# Patient Record
Sex: MALE | Race: WHITE | NOT HISPANIC OR LATINO
[De-identification: names, ages, dates, MRNs, and addresses within clinical notes are randomized per-mention and may not be internally consistent; named-entity substitution may affect disease eponyms.]

---

## 2017-01-30 ENCOUNTER — MEDICATION RENEWAL (OUTPATIENT)
Age: 37
End: 2017-01-30

## 2017-04-25 ENCOUNTER — RX RENEWAL (OUTPATIENT)
Age: 37
End: 2017-04-25

## 2017-04-27 ENCOUNTER — APPOINTMENT (OUTPATIENT)
Dept: CARDIOLOGY | Facility: CLINIC | Age: 37
End: 2017-04-27

## 2017-07-24 ENCOUNTER — APPOINTMENT (OUTPATIENT)
Dept: RADIOLOGY | Facility: HOSPITAL | Age: 37
End: 2017-07-24

## 2017-07-24 ENCOUNTER — OUTPATIENT (OUTPATIENT)
Dept: OUTPATIENT SERVICES | Facility: HOSPITAL | Age: 37
LOS: 1 days | End: 2017-07-24
Payer: COMMERCIAL

## 2017-07-24 PROCEDURE — 74018 RADEX ABDOMEN 1 VIEW: CPT

## 2017-08-02 ENCOUNTER — RX RENEWAL (OUTPATIENT)
Age: 37
End: 2017-08-02

## 2017-10-26 ENCOUNTER — APPOINTMENT (OUTPATIENT)
Dept: CARDIOLOGY | Facility: CLINIC | Age: 37
End: 2017-10-26
Payer: COMMERCIAL

## 2017-10-26 ENCOUNTER — NON-APPOINTMENT (OUTPATIENT)
Age: 37
End: 2017-10-26

## 2017-10-26 VITALS
OXYGEN SATURATION: 97 % | HEIGHT: 72 IN | DIASTOLIC BLOOD PRESSURE: 82 MMHG | RESPIRATION RATE: 15 BRPM | HEART RATE: 61 BPM | SYSTOLIC BLOOD PRESSURE: 125 MMHG | WEIGHT: 214 LBS | BODY MASS INDEX: 28.99 KG/M2

## 2017-10-26 PROCEDURE — 99214 OFFICE O/P EST MOD 30 MIN: CPT

## 2017-10-26 PROCEDURE — 93000 ELECTROCARDIOGRAM COMPLETE: CPT

## 2017-10-26 RX ORDER — UBIDECARENONE/VIT E ACET 100MG-5
50 MCG CAPSULE ORAL
Refills: 0 | Status: ACTIVE | COMMUNITY

## 2017-11-06 ENCOUNTER — RX RENEWAL (OUTPATIENT)
Age: 37
End: 2017-11-06

## 2018-04-19 ENCOUNTER — APPOINTMENT (OUTPATIENT)
Dept: CARDIOLOGY | Facility: CLINIC | Age: 38
End: 2018-04-19

## 2018-07-25 ENCOUNTER — OUTPATIENT (OUTPATIENT)
Dept: OUTPATIENT SERVICES | Facility: HOSPITAL | Age: 38
LOS: 1 days | End: 2018-07-25
Payer: COMMERCIAL

## 2018-07-25 ENCOUNTER — APPOINTMENT (OUTPATIENT)
Dept: RADIOLOGY | Facility: HOSPITAL | Age: 38
End: 2018-07-25

## 2018-07-25 DIAGNOSIS — Z00.8 ENCOUNTER FOR OTHER GENERAL EXAMINATION: ICD-10-CM

## 2018-07-25 PROCEDURE — 73030 X-RAY EXAM OF SHOULDER: CPT | Mod: 26,RT

## 2018-07-25 PROCEDURE — 73030 X-RAY EXAM OF SHOULDER: CPT

## 2018-09-06 ENCOUNTER — NON-APPOINTMENT (OUTPATIENT)
Age: 38
End: 2018-09-06

## 2018-09-06 ENCOUNTER — APPOINTMENT (OUTPATIENT)
Dept: CARDIOLOGY | Facility: CLINIC | Age: 38
End: 2018-09-06
Payer: COMMERCIAL

## 2018-09-06 VITALS
SYSTOLIC BLOOD PRESSURE: 137 MMHG | RESPIRATION RATE: 15 BRPM | DIASTOLIC BLOOD PRESSURE: 86 MMHG | WEIGHT: 226 LBS | OXYGEN SATURATION: 98 % | BODY MASS INDEX: 30.61 KG/M2 | HEART RATE: 73 BPM | HEIGHT: 72 IN

## 2018-09-06 DIAGNOSIS — M25.473 EFFUSION, UNSPECIFIED ANKLE: ICD-10-CM

## 2018-09-06 PROCEDURE — 99214 OFFICE O/P EST MOD 30 MIN: CPT

## 2018-09-06 PROCEDURE — 93000 ELECTROCARDIOGRAM COMPLETE: CPT

## 2018-10-02 RX ORDER — CHLORTHALIDONE 25 MG/1
25 TABLET ORAL DAILY
Qty: 15 | Refills: 4 | Status: DISCONTINUED | COMMUNITY
Start: 2018-09-06 | End: 2018-10-02

## 2018-11-05 ENCOUNTER — RX RENEWAL (OUTPATIENT)
Age: 38
End: 2018-11-05

## 2018-12-04 ENCOUNTER — RX RENEWAL (OUTPATIENT)
Age: 38
End: 2018-12-04

## 2018-12-06 ENCOUNTER — APPOINTMENT (OUTPATIENT)
Dept: CARDIOLOGY | Facility: CLINIC | Age: 38
End: 2018-12-06
Payer: COMMERCIAL

## 2018-12-06 VITALS
OXYGEN SATURATION: 99 % | SYSTOLIC BLOOD PRESSURE: 146 MMHG | BODY MASS INDEX: 30.61 KG/M2 | HEIGHT: 72 IN | DIASTOLIC BLOOD PRESSURE: 97 MMHG | HEART RATE: 80 BPM | WEIGHT: 226 LBS | RESPIRATION RATE: 15 BRPM

## 2018-12-06 VITALS — SYSTOLIC BLOOD PRESSURE: 142 MMHG | DIASTOLIC BLOOD PRESSURE: 101 MMHG

## 2018-12-06 PROCEDURE — 99213 OFFICE O/P EST LOW 20 MIN: CPT

## 2018-12-06 NOTE — PHYSICAL EXAM
[General Appearance - Well Developed] : well developed [Normal Appearance] : normal appearance [Well Groomed] : well groomed [General Appearance - Well Nourished] : well nourished [No Deformities] : no deformities [General Appearance - In No Acute Distress] : no acute distress [Normal Conjunctiva] : the conjunctiva exhibited no abnormalities [Eyelids - No Xanthelasma] : the eyelids demonstrated no xanthelasmas [Normal Oral Mucosa] : normal oral mucosa [No Oral Pallor] : no oral pallor [No Oral Cyanosis] : no oral cyanosis [FreeTextEntry1] : JVP normal. No bruits. [Respiration, Rhythm And Depth] : normal respiratory rhythm and effort [Exaggerated Use Of Accessory Muscles For Inspiration] : no accessory muscle use [Auscultation Breath Sounds / Voice Sounds] : lungs were clear to auscultation bilaterally [Heart Rate And Rhythm] : heart rate and rhythm were normal [Heart Sounds] : normal S1 and S2 [Murmurs] : no murmurs present [Abdomen Soft] : soft [Abdomen Tenderness] : non-tender [Abdomen Mass (___ Cm)] : no abdominal mass palpated [Abnormal Walk] : normal gait [Gait - Sufficient For Exercise Testing] : the gait was sufficient for exercise testing [Nail Clubbing] : no clubbing of the fingernails [Cyanosis, Localized] : no localized cyanosis [] : no ischemic changes [Skin Color & Pigmentation] : normal skin color and pigmentation [Skin Turgor] : normal skin turgor [Affect] : the affect was normal [Mood] : the mood was normal

## 2018-12-06 NOTE — DISCUSSION/SUMMARY
[Patient] : the patient [Risks] : risks [Benefits] : benefits [Alternatives] : alternatives [___ Month(s)] : [unfilled] month(s) [FreeTextEntry1] : Discussed with him regarding better compliance exercise diet weight loss low-sodium diet increase of medication followup in several months repeat labs later next year

## 2018-12-06 NOTE — REASON FOR VISIT
[Hypertension] : hypertension [Medication Management] : Medication management [FreeTextEntry1] : \par He feels well. Has stress related to household situation.\par \par He denied any chest pain dyspnea or palpitations.

## 2018-12-06 NOTE — REVIEW OF SYSTEMS
[Recent Weight Gain (___ Lbs)] : recent [unfilled] ~Ulb weight gain [see HPI] : see HPI [Dizziness] : no dizziness [Negative] : Heme/Lymph

## 2018-12-26 ENCOUNTER — RX RENEWAL (OUTPATIENT)
Age: 38
End: 2018-12-26

## 2019-01-31 ENCOUNTER — RX RENEWAL (OUTPATIENT)
Age: 39
End: 2019-01-31

## 2019-02-26 ENCOUNTER — RX RENEWAL (OUTPATIENT)
Age: 39
End: 2019-02-26

## 2019-04-12 ENCOUNTER — RX RENEWAL (OUTPATIENT)
Age: 39
End: 2019-04-12

## 2019-04-21 ENCOUNTER — RX RENEWAL (OUTPATIENT)
Age: 39
End: 2019-04-21

## 2019-05-16 ENCOUNTER — RX RENEWAL (OUTPATIENT)
Age: 39
End: 2019-05-16

## 2019-05-20 ENCOUNTER — RX RENEWAL (OUTPATIENT)
Age: 39
End: 2019-05-20

## 2019-06-11 ENCOUNTER — RX RENEWAL (OUTPATIENT)
Age: 39
End: 2019-06-11

## 2019-06-19 ENCOUNTER — RX RENEWAL (OUTPATIENT)
Age: 39
End: 2019-06-19

## 2019-07-21 ENCOUNTER — RX RENEWAL (OUTPATIENT)
Age: 39
End: 2019-07-21

## 2019-08-01 ENCOUNTER — RX RENEWAL (OUTPATIENT)
Age: 39
End: 2019-08-01

## 2019-08-17 ENCOUNTER — RX RENEWAL (OUTPATIENT)
Age: 39
End: 2019-08-17

## 2019-08-29 ENCOUNTER — RX RENEWAL (OUTPATIENT)
Age: 39
End: 2019-08-29

## 2019-09-16 ENCOUNTER — RX RENEWAL (OUTPATIENT)
Age: 39
End: 2019-09-16

## 2019-10-28 ENCOUNTER — RX RENEWAL (OUTPATIENT)
Age: 39
End: 2019-10-28

## 2019-11-24 ENCOUNTER — RX RENEWAL (OUTPATIENT)
Age: 39
End: 2019-11-24

## 2019-12-23 ENCOUNTER — RX RENEWAL (OUTPATIENT)
Age: 39
End: 2019-12-23

## 2020-01-21 ENCOUNTER — RX RENEWAL (OUTPATIENT)
Age: 40
End: 2020-01-21

## 2020-02-06 ENCOUNTER — RX RENEWAL (OUTPATIENT)
Age: 40
End: 2020-02-06

## 2020-02-24 ENCOUNTER — RX RENEWAL (OUTPATIENT)
Age: 40
End: 2020-02-24

## 2020-03-06 ENCOUNTER — RX RENEWAL (OUTPATIENT)
Age: 40
End: 2020-03-06

## 2020-03-09 ENCOUNTER — APPOINTMENT (OUTPATIENT)
Dept: CARDIOLOGY | Facility: CLINIC | Age: 40
End: 2020-03-09
Payer: COMMERCIAL

## 2020-03-09 VITALS — SYSTOLIC BLOOD PRESSURE: 162 MMHG | DIASTOLIC BLOOD PRESSURE: 110 MMHG

## 2020-03-09 VITALS
DIASTOLIC BLOOD PRESSURE: 111 MMHG | RESPIRATION RATE: 16 BRPM | WEIGHT: 242 LBS | OXYGEN SATURATION: 98 % | HEART RATE: 89 BPM | SYSTOLIC BLOOD PRESSURE: 170 MMHG | HEIGHT: 72 IN | BODY MASS INDEX: 32.78 KG/M2

## 2020-03-09 PROCEDURE — 99214 OFFICE O/P EST MOD 30 MIN: CPT

## 2020-03-09 NOTE — ASSESSMENT
[FreeTextEntry1] : Uncontrolled hypertension. Headache which is probably unrelated. Previous GI upset with thiazide

## 2020-03-09 NOTE — REVIEW OF SYSTEMS
[see HPI] : see HPI [Headache] : headache [Recent Weight Gain (___ Lbs)] : no recent weight gain [Dizziness] : no dizziness [Negative] : Heme/Lymph

## 2020-03-09 NOTE — DISCUSSION/SUMMARY
[Patient] : the patient [Risks] : risks [Benefits] : benefits [Alternatives] : alternatives [___ Day(s)] : [unfilled] day(s) [FreeTextEntry1] : Discussed with patient initiated labetalol in addition the current medicine followup in 3 days labwork and renal duplex to be obtained regarding elevated blood pressure

## 2020-03-09 NOTE — PHYSICAL EXAM
[General Appearance - Well Developed] : well developed [Normal Appearance] : normal appearance [Well Groomed] : well groomed [General Appearance - Well Nourished] : well nourished [No Deformities] : no deformities [General Appearance - In No Acute Distress] : no acute distress [Normal Conjunctiva] : the conjunctiva exhibited no abnormalities [Eyelids - No Xanthelasma] : the eyelids demonstrated no xanthelasmas [Normal Oral Mucosa] : normal oral mucosa [No Oral Pallor] : no oral pallor [No Oral Cyanosis] : no oral cyanosis [Respiration, Rhythm And Depth] : normal respiratory rhythm and effort [Exaggerated Use Of Accessory Muscles For Inspiration] : no accessory muscle use [Auscultation Breath Sounds / Voice Sounds] : lungs were clear to auscultation bilaterally [Heart Rate And Rhythm] : heart rate and rhythm were normal [Heart Sounds] : normal S1 and S2 [Murmurs] : no murmurs present [Abdomen Soft] : soft [Abdomen Tenderness] : non-tender [Abdomen Mass (___ Cm)] : no abdominal mass palpated [FreeTextEntry1] : no bruits [Abnormal Walk] : normal gait [Gait - Sufficient For Exercise Testing] : the gait was sufficient for exercise testing [Nail Clubbing] : no clubbing of the fingernails [Cyanosis, Localized] : no localized cyanosis [] : no ischemic changes [Skin Color & Pigmentation] : normal skin color and pigmentation [Skin Turgor] : normal skin turgor [Affect] : the affect was normal [Mood] : the mood was normal

## 2020-03-09 NOTE — HISTORY OF PRESENT ILLNESS
[FreeTextEntry1] : Presents urgently with elevated blood pressure last night diastolics over 110 and elevated systolics as well as her chronic headache which is not unusual takes Advil about twice a week is not meticulous about sodium in diet but is compliant with medication. No chest pain shortness of breath.

## 2020-03-12 ENCOUNTER — APPOINTMENT (OUTPATIENT)
Dept: CARDIOLOGY | Facility: CLINIC | Age: 40
End: 2020-03-12
Payer: COMMERCIAL

## 2020-03-12 VITALS
RESPIRATION RATE: 16 BRPM | OXYGEN SATURATION: 97 % | SYSTOLIC BLOOD PRESSURE: 160 MMHG | DIASTOLIC BLOOD PRESSURE: 92 MMHG | HEART RATE: 79 BPM | HEIGHT: 72 IN

## 2020-03-12 VITALS — SYSTOLIC BLOOD PRESSURE: 142 MMHG | DIASTOLIC BLOOD PRESSURE: 90 MMHG

## 2020-03-12 PROCEDURE — 99213 OFFICE O/P EST LOW 20 MIN: CPT

## 2020-03-12 RX ORDER — LABETALOL HYDROCHLORIDE 100 MG/1
100 TABLET, FILM COATED ORAL
Qty: 60 | Refills: 2 | Status: DISCONTINUED | COMMUNITY
Start: 2020-03-09 | End: 2020-03-12

## 2020-03-12 NOTE — DISCUSSION/SUMMARY
[Patient] : the patient [Risks] : risks [Benefits] : benefits [Alternatives] : alternatives [___ Week(s)] : [unfilled] week(s) [FreeTextEntry1] : Will increase labetalol will be 3 times a day. Questions addressed dietary counseling performed monitor BP at home once or twice a day followup in 3-4 weeks. Requesting labs from PMD Dr. Gorman

## 2020-03-12 NOTE — HISTORY OF PRESENT ILLNESS
[FreeTextEntry1] : Tolerated medications without problem BPs range from 120 systolic to about 170 sometimes in the afternoon is taking labetalol twice daily no chest pain flushing palpitations leg edema. Has cut down on sodium and carbs. Feels okay.

## 2020-03-12 NOTE — ASSESSMENT
[FreeTextEntry1] : Improving hypertension with lability still present long-standing history of hypertension. Overweight.

## 2020-03-12 NOTE — REVIEW OF SYSTEMS
[Headache] : no headache [Recent Weight Gain (___ Lbs)] : no recent weight gain [Dizziness] : no dizziness [Under Stress] : under stress [Negative] : Heme/Lymph

## 2020-03-12 NOTE — PHYSICAL EXAM
[General Appearance - Well Developed] : well developed [Normal Appearance] : normal appearance [Well Groomed] : well groomed [General Appearance - Well Nourished] : well nourished [No Deformities] : no deformities [General Appearance - In No Acute Distress] : no acute distress [Normal Conjunctiva] : the conjunctiva exhibited no abnormalities [Eyelids - No Xanthelasma] : the eyelids demonstrated no xanthelasmas [Affect] : the affect was normal [Mood] : the mood was normal

## 2020-03-22 ENCOUNTER — RX RENEWAL (OUTPATIENT)
Age: 40
End: 2020-03-22

## 2020-04-06 ENCOUNTER — APPOINTMENT (OUTPATIENT)
Dept: CARDIOLOGY | Facility: CLINIC | Age: 40
End: 2020-04-06

## 2020-06-05 ENCOUNTER — RX RENEWAL (OUTPATIENT)
Age: 40
End: 2020-06-05

## 2020-07-10 ENCOUNTER — RX RENEWAL (OUTPATIENT)
Age: 40
End: 2020-07-10

## 2020-07-12 ENCOUNTER — RX RENEWAL (OUTPATIENT)
Age: 40
End: 2020-07-12

## 2020-09-17 ENCOUNTER — APPOINTMENT (OUTPATIENT)
Dept: CARDIOLOGY | Facility: CLINIC | Age: 40
End: 2020-09-17
Payer: COMMERCIAL

## 2020-09-17 ENCOUNTER — TRANSCRIPTION ENCOUNTER (OUTPATIENT)
Age: 40
End: 2020-09-17

## 2020-09-17 ENCOUNTER — NON-APPOINTMENT (OUTPATIENT)
Age: 40
End: 2020-09-17

## 2020-09-17 VITALS
RESPIRATION RATE: 16 BRPM | OXYGEN SATURATION: 99 % | WEIGHT: 222 LBS | DIASTOLIC BLOOD PRESSURE: 84 MMHG | SYSTOLIC BLOOD PRESSURE: 127 MMHG | TEMPERATURE: 98.7 F | HEIGHT: 72 IN | HEART RATE: 82 BPM | BODY MASS INDEX: 30.07 KG/M2

## 2020-09-17 PROCEDURE — 93000 ELECTROCARDIOGRAM COMPLETE: CPT

## 2020-09-17 PROCEDURE — 99213 OFFICE O/P EST LOW 20 MIN: CPT

## 2020-09-17 NOTE — PHYSICAL EXAM
[General Appearance - Well Developed] : well developed [Normal Appearance] : normal appearance [Well Groomed] : well groomed [General Appearance - Well Nourished] : well nourished [No Deformities] : no deformities [General Appearance - In No Acute Distress] : no acute distress [Normal Conjunctiva] : the conjunctiva exhibited no abnormalities [Eyelids - No Xanthelasma] : the eyelids demonstrated no xanthelasmas [FreeTextEntry1] : JVP normal. No bruits. [Respiration, Rhythm And Depth] : normal respiratory rhythm and effort [Exaggerated Use Of Accessory Muscles For Inspiration] : no accessory muscle use [Auscultation Breath Sounds / Voice Sounds] : lungs were clear to auscultation bilaterally [Heart Rate And Rhythm] : heart rate and rhythm were normal [Heart Sounds] : normal S1 and S2 [Murmurs] : no murmurs present [Abdomen Soft] : soft [Abdomen Tenderness] : non-tender [] : no hepato-splenomegaly [Abdomen Mass (___ Cm)] : no abdominal mass palpated [Abnormal Walk] : normal gait [Gait - Sufficient For Exercise Testing] : the gait was sufficient for exercise testing [Nail Clubbing] : no clubbing of the fingernails [Cyanosis, Localized] : no localized cyanosis [Skin Color & Pigmentation] : normal skin color and pigmentation [Skin Turgor] : normal skin turgor

## 2020-09-17 NOTE — DISCUSSION/SUMMARY
[Patient] : the patient [Risks] : risks [Benefits] : benefits [Alternatives] : alternatives [___ Month(s)] : [unfilled] month(s) [FreeTextEntry1] : Requesting labs from PMD Dr. Gorman\par reports influenza vaccine today

## 2020-09-17 NOTE — HISTORY OF PRESENT ILLNESS
[FreeTextEntry1] : blood pressure seems to have improved takes labetalol 3 times a day other meds as previously noted has been at home doing some exercise and losing weight voluntarily no chest pain dyspnea or palpitations

## 2020-09-17 NOTE — ASSESSMENT
[FreeTextEntry1] : Improving hypertension with lability still present long-standing history of hypertension. Overweight, improved

## 2020-09-21 ENCOUNTER — RX RENEWAL (OUTPATIENT)
Age: 40
End: 2020-09-21

## 2021-01-16 ENCOUNTER — RX RENEWAL (OUTPATIENT)
Age: 41
End: 2021-01-16

## 2021-02-14 ENCOUNTER — RX RENEWAL (OUTPATIENT)
Age: 41
End: 2021-02-14

## 2021-02-22 ENCOUNTER — APPOINTMENT (OUTPATIENT)
Dept: CARDIOLOGY | Facility: CLINIC | Age: 41
End: 2021-02-22
Payer: COMMERCIAL

## 2021-02-22 ENCOUNTER — NON-APPOINTMENT (OUTPATIENT)
Age: 41
End: 2021-02-22

## 2021-02-22 VITALS
TEMPERATURE: 97.8 F | SYSTOLIC BLOOD PRESSURE: 137 MMHG | WEIGHT: 232 LBS | OXYGEN SATURATION: 98 % | BODY MASS INDEX: 31.42 KG/M2 | RESPIRATION RATE: 16 BRPM | HEART RATE: 79 BPM | HEIGHT: 72 IN | DIASTOLIC BLOOD PRESSURE: 88 MMHG

## 2021-02-22 VITALS — DIASTOLIC BLOOD PRESSURE: 82 MMHG | SYSTOLIC BLOOD PRESSURE: 134 MMHG

## 2021-02-22 PROCEDURE — 99072 ADDL SUPL MATRL&STAF TM PHE: CPT

## 2021-02-22 PROCEDURE — 99213 OFFICE O/P EST LOW 20 MIN: CPT

## 2021-02-22 PROCEDURE — 93000 ELECTROCARDIOGRAM COMPLETE: CPT

## 2021-02-22 NOTE — HISTORY OF PRESENT ILLNESS
[FreeTextEntry1] : Overall feeling well.  Active physically.  No change in medications.  No chest pain dyspnea or palpitations, although sometimes he feels like something drops for an instant in his chest happens rarely less than once a month.

## 2021-02-22 NOTE — DISCUSSION/SUMMARY
[Patient] : the patient [Risks] : risks [Benefits] : benefits [Alternatives] : alternatives [___ Month(s)] : [unfilled] month(s) [FreeTextEntry1] : \par Patient advised to continue current cardiac medication at this time.\par Recommended diet and weight loss, regular exericse.\par exercise program recommended and discussed\par

## 2021-02-22 NOTE — ASSESSMENT
[FreeTextEntry1] : Improving hypertension with lability still present long-standing history of hypertension.

## 2021-02-22 NOTE — REVIEW OF SYSTEMS
[Headache] : no headache [Recent Weight Gain (___ Lbs)] : recent [unfilled] ~Ulb weight gain [Dizziness] : no dizziness [Under Stress] : under stress [Negative] : Heme/Lymph

## 2021-03-13 ENCOUNTER — RX RENEWAL (OUTPATIENT)
Age: 41
End: 2021-03-13

## 2021-03-25 ENCOUNTER — RX RENEWAL (OUTPATIENT)
Age: 41
End: 2021-03-25

## 2021-05-04 ENCOUNTER — RESULT REVIEW (OUTPATIENT)
Age: 41
End: 2021-05-04

## 2021-05-04 ENCOUNTER — APPOINTMENT (OUTPATIENT)
Dept: ULTRASOUND IMAGING | Facility: CLINIC | Age: 41
End: 2021-05-04
Payer: COMMERCIAL

## 2021-05-04 ENCOUNTER — OUTPATIENT (OUTPATIENT)
Dept: OUTPATIENT SERVICES | Facility: HOSPITAL | Age: 41
LOS: 1 days | End: 2021-05-04
Payer: COMMERCIAL

## 2021-05-04 DIAGNOSIS — I10 ESSENTIAL (PRIMARY) HYPERTENSION: ICD-10-CM

## 2021-05-04 PROCEDURE — 93975 VASCULAR STUDY: CPT | Mod: 26

## 2021-05-04 PROCEDURE — 93975 VASCULAR STUDY: CPT

## 2021-05-05 ENCOUNTER — NON-APPOINTMENT (OUTPATIENT)
Age: 41
End: 2021-05-05

## 2021-06-03 DIAGNOSIS — R00.2 PALPITATIONS: ICD-10-CM

## 2021-06-04 ENCOUNTER — APPOINTMENT (OUTPATIENT)
Dept: CARDIOLOGY | Facility: CLINIC | Age: 41
End: 2021-06-04
Payer: COMMERCIAL

## 2021-06-04 PROCEDURE — 99072 ADDL SUPL MATRL&STAF TM PHE: CPT

## 2021-06-10 ENCOUNTER — NON-APPOINTMENT (OUTPATIENT)
Age: 41
End: 2021-06-10

## 2021-06-10 PROCEDURE — 93224 XTRNL ECG REC UP TO 48 HRS: CPT

## 2021-06-14 ENCOUNTER — APPOINTMENT (OUTPATIENT)
Dept: CARDIOLOGY | Facility: CLINIC | Age: 41
End: 2021-06-14
Payer: COMMERCIAL

## 2021-06-14 VITALS
SYSTOLIC BLOOD PRESSURE: 129 MMHG | HEIGHT: 72 IN | HEART RATE: 65 BPM | BODY MASS INDEX: 30.88 KG/M2 | WEIGHT: 228 LBS | DIASTOLIC BLOOD PRESSURE: 85 MMHG | TEMPERATURE: 97 F | RESPIRATION RATE: 16 BRPM | OXYGEN SATURATION: 97 %

## 2021-06-14 DIAGNOSIS — I49.3 VENTRICULAR PREMATURE DEPOLARIZATION: ICD-10-CM

## 2021-06-14 DIAGNOSIS — R73.01 IMPAIRED FASTING GLUCOSE: ICD-10-CM

## 2021-06-14 DIAGNOSIS — I34.1 NONRHEUMATIC MITRAL (VALVE) PROLAPSE: ICD-10-CM

## 2021-06-14 PROCEDURE — 99072 ADDL SUPL MATRL&STAF TM PHE: CPT

## 2021-06-14 PROCEDURE — 99214 OFFICE O/P EST MOD 30 MIN: CPT

## 2021-06-14 NOTE — ASSESSMENT
[FreeTextEntry1] : Hypertension on therapy.  Palpitations improving probably related to PVCs demonstrated on Holter

## 2021-06-14 NOTE — DISCUSSION/SUMMARY
[Patient] : the patient [Risks] : risks [Benefits] : benefits [Alternatives] : alternatives [FreeTextEntry1] : Discussed with patient.  Lab work ordered.  Caffeine seems to not influence his symptoms.  ENT follow-up.  Continue current medication including beta-blocker.  Questions addressed with patient.  Return for echo regarding palpitations PVCs.  Call me for blood work results.

## 2021-06-21 ENCOUNTER — RX RENEWAL (OUTPATIENT)
Age: 41
End: 2021-06-21

## 2021-06-21 ENCOUNTER — APPOINTMENT (OUTPATIENT)
Dept: CARDIOLOGY | Facility: CLINIC | Age: 41
End: 2021-06-21
Payer: COMMERCIAL

## 2021-06-21 PROCEDURE — 99072 ADDL SUPL MATRL&STAF TM PHE: CPT

## 2021-06-21 PROCEDURE — 93306 TTE W/DOPPLER COMPLETE: CPT

## 2021-06-22 LAB
ALBUMIN SERPL ELPH-MCNC: 4.2 G/DL
ALP BLD-CCNC: 61 U/L
ALT SERPL-CCNC: 17 U/L
ANION GAP SERPL CALC-SCNC: 9 MMOL/L
AST SERPL-CCNC: 14 U/L
BASOPHILS # BLD AUTO: 0.05 K/UL
BASOPHILS NFR BLD AUTO: 1.1 %
BILIRUB SERPL-MCNC: 0.2 MG/DL
BUN SERPL-MCNC: 21 MG/DL
CALCIUM SERPL-MCNC: 9.6 MG/DL
CHLORIDE SERPL-SCNC: 106 MMOL/L
CHOLEST SERPL-MCNC: 204 MG/DL
CO2 SERPL-SCNC: 25 MMOL/L
CREAT SERPL-MCNC: 1.15 MG/DL
EOSINOPHIL # BLD AUTO: 0.21 K/UL
EOSINOPHIL NFR BLD AUTO: 4.5 %
ESTIMATED AVERAGE GLUCOSE: 111 MG/DL
GLUCOSE BS SERPL-MCNC: 93 MG/DL
GLUCOSE SERPL-MCNC: 97 MG/DL
HBA1C MFR BLD HPLC: 5.5 %
HCT VFR BLD CALC: 40.5 %
HDLC SERPL-MCNC: 46 MG/DL
HGB BLD-MCNC: 13 G/DL
IMM GRANULOCYTES NFR BLD AUTO: 0.2 %
LDLC SERPL CALC-MCNC: 99 MG/DL
LYMPHOCYTES # BLD AUTO: 1.84 K/UL
LYMPHOCYTES NFR BLD AUTO: 39.7 %
MAGNESIUM SERPL-MCNC: 2 MG/DL
MAN DIFF?: NORMAL
MCHC RBC-ENTMCNC: 30.2 PG
MCHC RBC-ENTMCNC: 32.1 GM/DL
MCV RBC AUTO: 94 FL
MONOCYTES # BLD AUTO: 0.61 K/UL
MONOCYTES NFR BLD AUTO: 13.2 %
NEUTROPHILS # BLD AUTO: 1.91 K/UL
NEUTROPHILS NFR BLD AUTO: 41.3 %
NONHDLC SERPL-MCNC: 157 MG/DL
PLATELET # BLD AUTO: 337 K/UL
POTASSIUM SERPL-SCNC: 4.5 MMOL/L
PROT SERPL-MCNC: 6.7 G/DL
RBC # BLD: 4.31 M/UL
RBC # FLD: 13.1 %
SODIUM SERPL-SCNC: 140 MMOL/L
T3FREE SERPL-MCNC: 2.86 PG/ML
T4 FREE SERPL-MCNC: 1 NG/DL
TRIGL SERPL-MCNC: 292 MG/DL
TSH SERPL-ACNC: 1.46 UIU/ML
WBC # FLD AUTO: 4.63 K/UL

## 2021-08-02 ENCOUNTER — RX RENEWAL (OUTPATIENT)
Age: 41
End: 2021-08-02

## 2021-10-18 ENCOUNTER — RX RENEWAL (OUTPATIENT)
Age: 41
End: 2021-10-18

## 2021-10-18 ENCOUNTER — APPOINTMENT (OUTPATIENT)
Dept: CARDIOLOGY | Facility: CLINIC | Age: 41
End: 2021-10-18

## 2022-01-16 ENCOUNTER — RX RENEWAL (OUTPATIENT)
Age: 42
End: 2022-01-16

## 2022-02-11 ENCOUNTER — RX RENEWAL (OUTPATIENT)
Age: 42
End: 2022-02-11

## 2022-02-17 ENCOUNTER — NON-APPOINTMENT (OUTPATIENT)
Age: 42
End: 2022-02-17

## 2022-02-17 ENCOUNTER — APPOINTMENT (OUTPATIENT)
Dept: CARDIOLOGY | Facility: CLINIC | Age: 42
End: 2022-02-17
Payer: COMMERCIAL

## 2022-02-17 VITALS
DIASTOLIC BLOOD PRESSURE: 91 MMHG | RESPIRATION RATE: 16 BRPM | HEART RATE: 86 BPM | HEIGHT: 72 IN | TEMPERATURE: 97.5 F | WEIGHT: 225 LBS | BODY MASS INDEX: 30.48 KG/M2 | OXYGEN SATURATION: 96 % | SYSTOLIC BLOOD PRESSURE: 159 MMHG

## 2022-02-17 VITALS — DIASTOLIC BLOOD PRESSURE: 94 MMHG | SYSTOLIC BLOOD PRESSURE: 124 MMHG

## 2022-02-17 PROCEDURE — 99214 OFFICE O/P EST MOD 30 MIN: CPT

## 2022-02-17 PROCEDURE — 93000 ELECTROCARDIOGRAM COMPLETE: CPT

## 2022-02-17 RX ORDER — LOSARTAN POTASSIUM 100 MG/1
100 TABLET, FILM COATED ORAL
Qty: 90 | Refills: 3 | Status: DISCONTINUED | COMMUNITY
Start: 2021-05-05 | End: 2022-02-17

## 2022-02-17 NOTE — DISCUSSION/SUMMARY
[Patient] : the patient [Risks] : risks [Benefits] : benefits [Alternatives] : alternatives [FreeTextEntry1] : Discussed with patient.  Lab work reviewed.\par \par We will change his ARB and increase his labetalol total dosing and make twice daily for convenience as well monitor at home.  He is to notify me how blood pressure responds and otherwise follow-up 4 months.

## 2022-02-17 NOTE — ASSESSMENT
[FreeTextEntry1] : Hypertension on therapy.  Palpitations improving probably related to PVCs demonstrated on Holter.  Overweight

## 2022-02-17 NOTE — REASON FOR VISIT
[Hypertension] : hypertension [FreeTextEntry1] : Has been feeling well not as active as usual related to Covid.  No chest pain dyspnea palpitations or edema.  Home blood pressures he says usually elevated 140 or 150 over 90s occasionally as low as 120 systolic.  Taking meds as directed.  Had blood work with PMD recently told was satisfactory.

## 2022-02-17 NOTE — REVIEW OF SYSTEMS
[Sinus Pressure] : no sinus pressure [Palpitations] : no palpitations [Under Stress] : under stress [Negative] : Respiratory

## 2022-02-17 NOTE — CARDIOLOGY SUMMARY
[de-identified] : February 17, 2022 sinus rhythm perfect thank you [de-identified] : December 2014 mild MVP normal LV function\par June 2021 normal LV systolic function

## 2022-03-15 ENCOUNTER — APPOINTMENT (OUTPATIENT)
Dept: RADIOLOGY | Facility: CLINIC | Age: 42
End: 2022-03-15
Payer: COMMERCIAL

## 2022-03-15 ENCOUNTER — OUTPATIENT (OUTPATIENT)
Dept: OUTPATIENT SERVICES | Facility: HOSPITAL | Age: 42
LOS: 1 days | End: 2022-03-15
Payer: COMMERCIAL

## 2022-03-15 DIAGNOSIS — Z00.8 ENCOUNTER FOR OTHER GENERAL EXAMINATION: ICD-10-CM

## 2022-03-15 PROCEDURE — 72110 X-RAY EXAM L-2 SPINE 4/>VWS: CPT

## 2022-03-15 PROCEDURE — 72110 X-RAY EXAM L-2 SPINE 4/>VWS: CPT | Mod: 26

## 2022-05-14 ENCOUNTER — EMERGENCY (EMERGENCY)
Facility: HOSPITAL | Age: 42
LOS: 1 days | Discharge: HOME | End: 2022-05-14
Attending: EMERGENCY MEDICINE | Admitting: EMERGENCY MEDICINE
Payer: COMMERCIAL

## 2022-05-14 VITALS
HEART RATE: 75 BPM | SYSTOLIC BLOOD PRESSURE: 132 MMHG | RESPIRATION RATE: 20 BRPM | OXYGEN SATURATION: 97 % | DIASTOLIC BLOOD PRESSURE: 87 MMHG | TEMPERATURE: 99 F

## 2022-05-14 VITALS
WEIGHT: 164.91 LBS | RESPIRATION RATE: 14 BRPM | OXYGEN SATURATION: 99 % | TEMPERATURE: 99 F | DIASTOLIC BLOOD PRESSURE: 85 MMHG | HEART RATE: 77 BPM | HEIGHT: 71 IN | SYSTOLIC BLOOD PRESSURE: 139 MMHG

## 2022-05-14 PROCEDURE — 99283 EMERGENCY DEPT VISIT LOW MDM: CPT

## 2022-05-14 PROCEDURE — 93010 ELECTROCARDIOGRAM REPORT: CPT

## 2022-05-14 PROCEDURE — 99284 EMERGENCY DEPT VISIT MOD MDM: CPT

## 2022-05-14 PROCEDURE — 93005 ELECTROCARDIOGRAM TRACING: CPT

## 2022-05-14 RX ORDER — OXYMETAZOLINE HYDROCHLORIDE 0.5 MG/ML
2 SPRAY NASAL ONCE
Refills: 0 | Status: DISCONTINUED | OUTPATIENT
Start: 2022-05-14 | End: 2022-05-18

## 2022-05-14 NOTE — ED PROVIDER NOTE - CLINICAL SUMMARY MEDICAL DECISION MAKING FREE TEXT BOX
Pt dx COVID+ on 5/10. Pt is vaccinated and boosted. Pt presents c/o mild nosebleed PTA. Pt reports mild nasal congestion. Pt relates nosebleed has self-resolved with pressure. Denies cp, sob, dizziness, n/v. No active bleeding at this time. Plan outpatient follow-up. Pt dx COVID+ on 5/10. Pt is vaccinated and boosted. Pt presents c/o mild nosebleed PTA. Pt reports mild nasal congestion. Pt relates nosebleed has self-resolved after he applied pressure. Denies cp, sob, dizziness, n/v. No active bleeding at this time. Plan outpatient follow-up.

## 2022-05-14 NOTE — ED PROVIDER NOTE - OBJECTIVE STATEMENT
pt is a 43yo male with pmhx of htn presents via ems with nose bleed. pt reports he had a nose bleed for 15 minutes pta that since resolved. pt was concerned because he was recently dx with covid 19 on 5/10 on a routine test. pt currently on asa. denies fever, cp, sob.

## 2022-05-14 NOTE — ED PROVIDER NOTE - PATIENT PORTAL LINK FT
You can access the FollowMyHealth Patient Portal offered by [facility] by registering at the following website: [email or web address]. By joining [employer]’s FollowMyHealth portal, you will also be able to view your health information using other applications (apps) compatible with our system.

## 2022-05-14 NOTE — ED PROVIDER NOTE - NSFOLLOWUPCLINICS_GEN_ALL_ED_FT
A Family Medicine Doctor  Family Medicine  .  [place]   Phone:   Fax:     [facility] Internal Medicine  General Internal Medicine  [address] [place] [id number]  Phone: [phone]  Fax:

## 2022-05-14 NOTE — ED PROVIDER NOTE - NS ED ATTENDING STATEMENT MOD
This was a shared visit with the ALICIA. I reviewed and verified the documentation and independently performed the documented:

## 2022-05-14 NOTE — ED PROVIDER NOTE - NSFOLLOWUPINSTRUCTIONS_ED_ALL_ED_FT
1. FOLLOW UP WITH YOUR PRIMARY DOCTOR IN 24-48 HOURS.   2. FOLLOW UP WITH ALL SPECIALIST DISCUSSED DURING YOUR VISIT.   3. TAKE ALL MEDICATIONS PRESCRIBED IN THE ER IF ANY ARE PRESCRIBED. CONTINUE YOUR HOME MEDICATIONS UNLESS OTHERWISE ADVISED DIFFERENTLY.   4. RETURN FOR WORSENING SYMPTOMS OR CONCERNS INCLUDING BUT NOT LIMITED TO FEVER, CHEST PAIN, OR TROUBLE BREATHING OR ANY OTHER CONCERNS  use saline nasal spray  use humidifier   use Vaseline in your nose     Nosebleed, Adult      A nosebleed is when blood comes out of the nose. Nosebleeds are common and can be caused by many things. They are usually not a sign of a serious medical problem.      Follow these instructions at home:      When you have a nosebleed:      •Sit down.      •Tilt your head a little forward.    •Follow these steps:  1.Pinch your nose with a clean towel or tissue.      2.Keep pinching your nose for 5 minutes. Do not let go.      3.After 5 minutes, let go of your nose.      4.If there is still bleeding, do these steps again. Keep doing these steps until the bleeding stops.        • Do not put tissues or other things in your nose to stop the bleeding.      •Avoid lying down or putting your head back.      •Use a nose spray decongestant as told by your doctor.      After a nosebleed:     •Try not to blow your nose or sniffle for several hours.      •Try not to strain, lift, or bend at the waist for several days.    •Aspirin and blood-thinning medicines make bleeding more likely. If you take these medicines:  •Ask your doctor if you should stop taking them or if you should change how much you take.      •Do not stop taking the medicine unless your doctor tells you to.      •If your nosebleed was caused by dryness, use over-the-counter saline nasal spray or gel and humidifier as told by your doctor. This will keep the inside of your nose moist and allow it to heal. If you need to use one of these products:  •Choose one that is water-soluble.       •Use only as much as you need and use it only as often as needed.       •Do not lie down right away after you use it.      •If you get nosebleeds often, talk with your doctor about treatments. These may include:  •Nasal cautery. A chemical swab or electrical device is used to lightly burn tiny blood vessels inside the nose. This helps stop or prevent nosebleeds.      •Nasal packing. A gauze or other material is placed in the nose to keep constant pressure on the bleeding area.          Contact a doctor if:    •You have a fever.      •You get nosebleeds often.      •You are getting nosebleeds more often than usual.      •You bruise very easily.      •You have something stuck in your nose.      •You have bleeding in your mouth.      •You vomit or cough up brown material.      •You get a nosebleed after you start a new medicine.        Get help right away if:    •You have a nosebleed after you fall or hurt your head.      •Your nosebleed does not go away after 20 minutes.      •You feel dizzy or weak.      •You have unusual bleeding from other parts of your body.      •You have unusual bruising on other parts of your body.      •You get sweaty.      •You vomit blood.        Summary    •Nosebleeds are common. They are usually not a sign of a serious medical problem.      •When you have a nosebleed, sit down and tilt your head a little forward. Pinch your nose with a clean tissue for 5 minutes.      •Use saline spray or saline gel and a humidifier as told by your doctor.      •Get help right away if your nosebleed does not go away after 20 minutes.      This information is not intended to replace advice given to you by your health care provider. Make sure you discuss any questions you have with your health care provider.      Document Revised: 10/15/2020 Document Reviewed: 10/15/2020    [employer] Patient Education © 2022 [employer]

## 2022-05-14 NOTE — ED ADULT NURSE NOTE - OBJECTIVE STATEMENT
43yo male BIBA, pt c/o nose bleed and stuffy nose,. pt is covid positive and on day 10 of isolation. pt denies fever, body aches or SOB. pt indicates

## 2022-05-17 ENCOUNTER — NON-APPOINTMENT (OUTPATIENT)
Age: 42
End: 2022-05-17

## 2022-05-17 ENCOUNTER — APPOINTMENT (OUTPATIENT)
Dept: CARDIOLOGY | Facility: CLINIC | Age: 42
End: 2022-05-17
Payer: COMMERCIAL

## 2022-05-17 VITALS — TEMPERATURE: 97.6 F

## 2022-05-17 VITALS — SYSTOLIC BLOOD PRESSURE: 108 MMHG | DIASTOLIC BLOOD PRESSURE: 74 MMHG

## 2022-05-17 VITALS — HEIGHT: 72 IN | WEIGHT: 220 LBS | BODY MASS INDEX: 29.8 KG/M2

## 2022-05-17 VITALS — OXYGEN SATURATION: 97 % | HEART RATE: 79 BPM | DIASTOLIC BLOOD PRESSURE: 90 MMHG | SYSTOLIC BLOOD PRESSURE: 143 MMHG

## 2022-05-17 DIAGNOSIS — Z00.00 ENCOUNTER FOR GENERAL ADULT MEDICAL EXAMINATION W/OUT ABNORMAL FINDINGS: ICD-10-CM

## 2022-05-17 DIAGNOSIS — U07.1 COVID-19: ICD-10-CM

## 2022-05-17 DIAGNOSIS — I10 ESSENTIAL (PRIMARY) HYPERTENSION: ICD-10-CM

## 2022-05-17 DIAGNOSIS — Z87.898 PERSONAL HISTORY OF OTHER SPECIFIED CONDITIONS: ICD-10-CM

## 2022-05-17 DIAGNOSIS — I95.9 HYPOTENSION, UNSPECIFIED: ICD-10-CM

## 2022-05-17 DIAGNOSIS — R42 DIZZINESS AND GIDDINESS: ICD-10-CM

## 2022-05-17 PROBLEM — Z86.79 PERSONAL HISTORY OF OTHER DISEASES OF THE CIRCULATORY SYSTEM: Chronic | Status: ACTIVE | Noted: 2022-05-14

## 2022-05-17 PROCEDURE — 99214 OFFICE O/P EST MOD 30 MIN: CPT

## 2022-05-17 PROCEDURE — 93000 ELECTROCARDIOGRAM COMPLETE: CPT

## 2022-05-17 RX ORDER — SPIRONOLACTONE 25 MG/1
25 TABLET ORAL
Qty: 15 | Refills: 0 | Status: DISCONTINUED | COMMUNITY
Start: 2019-03-24 | End: 2022-05-17

## 2022-05-17 RX ORDER — LABETALOL HYDROCHLORIDE 100 MG/1
100 TABLET, FILM COATED ORAL
Qty: 90 | Refills: 11 | Status: DISCONTINUED | COMMUNITY
Start: 2020-03-12 | End: 2022-05-17

## 2022-05-17 NOTE — DISCUSSION/SUMMARY
[Patient] : the patient [Risks] : risks [Benefits] : benefits [Alternatives] : alternatives [FreeTextEntry1] : Reviewed and discussed with him.  Monitor blood pressure basal states morning and perhaps p.m. postural precautions and hydration.  Lab testing pending.  May need to decrease medication.  Call me back in a few days.

## 2022-05-17 NOTE — REASON FOR VISIT
[Hypertension] : hypertension [Other: ____] : [unfilled] [FreeTextEntry3] : Gorman [FreeTextEntry1] : Patient reports had recent asymptomatic COVID infection.  He was placed on aspirin.  Developed nosebleeds which required ER visits ENT visit and cautery.\par \par Felt very lightheaded when getting up this morning blood pressure was 110 systolic.  It varies during the day typically in the 140 systolic range.  Take medications as directed.  No chest pain palpitations.

## 2022-05-17 NOTE — ASSESSMENT
[FreeTextEntry1] : Lightheadedness likely from relative hypotension related to polypharmacy.  Labile blood pressure.  Recent COVID-19 infection without symptoms.  Recent epistaxis

## 2022-05-17 NOTE — CARDIOLOGY SUMMARY
[de-identified] : The 17th, 2022 sinus rhythm [de-identified] : December 2014 mild MVP normal LV function\par June 2021 normal LV systolic function

## 2022-05-19 LAB
ALBUMIN SERPL ELPH-MCNC: 4.7 G/DL
ALP BLD-CCNC: 56 U/L
ALT SERPL-CCNC: 16 U/L
ANION GAP SERPL CALC-SCNC: 13 MMOL/L
AST SERPL-CCNC: 15 U/L
BASOPHILS # BLD AUTO: 0.02 K/UL
BASOPHILS NFR BLD AUTO: 0.3 %
BILIRUB SERPL-MCNC: 0.5 MG/DL
BUN SERPL-MCNC: 18 MG/DL
CALCIUM SERPL-MCNC: 9.9 MG/DL
CHLORIDE SERPL-SCNC: 103 MMOL/L
CO2 SERPL-SCNC: 25 MMOL/L
CREAT SERPL-MCNC: 1.3 MG/DL
CRP SERPL HS-MCNC: 2.35 MG/L
EGFR: 71 ML/MIN/1.73M2
EOSINOPHIL # BLD AUTO: 0.14 K/UL
EOSINOPHIL NFR BLD AUTO: 2.4 %
GLUCOSE SERPL-MCNC: 78 MG/DL
HCT VFR BLD CALC: 41.6 %
HGB BLD-MCNC: 13.5 G/DL
IMM GRANULOCYTES NFR BLD AUTO: 0.2 %
LYMPHOCYTES # BLD AUTO: 1.45 K/UL
LYMPHOCYTES NFR BLD AUTO: 24.7 %
MAN DIFF?: NORMAL
MCHC RBC-ENTMCNC: 30.3 PG
MCHC RBC-ENTMCNC: 32.5 GM/DL
MCV RBC AUTO: 93.3 FL
MONOCYTES # BLD AUTO: 0.58 K/UL
MONOCYTES NFR BLD AUTO: 9.9 %
NEUTROPHILS # BLD AUTO: 3.66 K/UL
NEUTROPHILS NFR BLD AUTO: 62.5 %
PLATELET # BLD AUTO: 303 K/UL
POTASSIUM SERPL-SCNC: 4.8 MMOL/L
PROT SERPL-MCNC: 7.3 G/DL
RBC # BLD: 4.46 M/UL
RBC # FLD: 13 %
SODIUM SERPL-SCNC: 141 MMOL/L
TSH SERPL-ACNC: 1.09 UIU/ML
WBC # FLD AUTO: 5.86 K/UL

## 2022-06-13 ENCOUNTER — RX RENEWAL (OUTPATIENT)
Age: 42
End: 2022-06-13

## 2022-07-11 ENCOUNTER — RX RENEWAL (OUTPATIENT)
Age: 42
End: 2022-07-11

## 2022-08-27 ENCOUNTER — NON-APPOINTMENT (OUTPATIENT)
Age: 42
End: 2022-08-27

## 2022-10-13 ENCOUNTER — APPOINTMENT (OUTPATIENT)
Dept: CARDIOLOGY | Facility: CLINIC | Age: 42
End: 2022-10-13

## 2022-10-13 ENCOUNTER — RX RENEWAL (OUTPATIENT)
Age: 42
End: 2022-10-13

## 2022-11-19 ENCOUNTER — NON-APPOINTMENT (OUTPATIENT)
Age: 42
End: 2022-11-19

## 2022-12-10 ENCOUNTER — NON-APPOINTMENT (OUTPATIENT)
Age: 42
End: 2022-12-10

## 2023-02-04 ENCOUNTER — NON-APPOINTMENT (OUTPATIENT)
Age: 43
End: 2023-02-04

## 2023-02-13 ENCOUNTER — RX RENEWAL (OUTPATIENT)
Age: 43
End: 2023-02-13

## 2023-05-09 ENCOUNTER — NON-APPOINTMENT (OUTPATIENT)
Age: 43
End: 2023-05-09

## 2023-06-06 ENCOUNTER — NON-APPOINTMENT (OUTPATIENT)
Age: 43
End: 2023-06-06

## 2023-08-07 ENCOUNTER — RX RENEWAL (OUTPATIENT)
Age: 43
End: 2023-08-07

## 2023-10-03 ENCOUNTER — NON-APPOINTMENT (OUTPATIENT)
Age: 43
End: 2023-10-03

## 2023-10-05 ENCOUNTER — RX RENEWAL (OUTPATIENT)
Age: 43
End: 2023-10-05

## 2023-11-03 ENCOUNTER — NON-APPOINTMENT (OUTPATIENT)
Age: 43
End: 2023-11-03

## 2024-02-03 ENCOUNTER — NON-APPOINTMENT (OUTPATIENT)
Age: 44
End: 2024-02-03

## 2024-02-04 RX ORDER — AMLODIPINE BESYLATE 5 MG/1
5 TABLET ORAL
Qty: 30 | Refills: 11 | Status: ACTIVE | COMMUNITY
Start: 2024-02-04 | End: 1900-01-01

## 2024-03-05 ENCOUNTER — RX RENEWAL (OUTPATIENT)
Age: 44
End: 2024-03-05

## 2024-03-05 RX ORDER — TELMISARTAN 80 MG/1
80 TABLET ORAL
Qty: 90 | Refills: 3 | Status: ACTIVE | COMMUNITY
Start: 2023-05-10 | End: 1900-01-01

## 2024-05-27 ENCOUNTER — RX RENEWAL (OUTPATIENT)
Age: 44
End: 2024-05-27

## 2024-05-27 RX ORDER — SPIRONOLACTONE 25 MG/1
25 TABLET ORAL
Qty: 15 | Refills: 2 | Status: ACTIVE | COMMUNITY
Start: 2024-02-04 | End: 1900-01-01

## 2024-06-13 ENCOUNTER — RX RENEWAL (OUTPATIENT)
Age: 44
End: 2024-06-13

## 2024-06-13 RX ORDER — LABETALOL HYDROCHLORIDE 200 MG/1
200 TABLET, FILM COATED ORAL
Qty: 60 | Refills: 3 | Status: ACTIVE | COMMUNITY
Start: 2024-02-04 | End: 1900-01-01

## 2024-08-20 ENCOUNTER — RX RENEWAL (OUTPATIENT)
Age: 44
End: 2024-08-20

## 2024-09-25 ENCOUNTER — RX RENEWAL (OUTPATIENT)
Age: 44
End: 2024-09-25

## 2024-11-13 ENCOUNTER — RX RENEWAL (OUTPATIENT)
Age: 44
End: 2024-11-13

## 2025-01-07 ENCOUNTER — EMERGENCY (EMERGENCY)
Facility: HOSPITAL | Age: 45
LOS: 1 days | Discharge: HOME | End: 2025-01-07
Attending: STUDENT IN AN ORGANIZED HEALTH CARE EDUCATION/TRAINING PROGRAM | Admitting: STUDENT IN AN ORGANIZED HEALTH CARE EDUCATION/TRAINING PROGRAM
Payer: MEDICAID

## 2025-01-07 VITALS
HEART RATE: 84 BPM | DIASTOLIC BLOOD PRESSURE: 78 MMHG | OXYGEN SATURATION: 97 % | RESPIRATION RATE: 18 BRPM | SYSTOLIC BLOOD PRESSURE: 112 MMHG | WEIGHT: 229.94 LBS | TEMPERATURE: 98 F | HEIGHT: 72 IN

## 2025-01-07 LAB
ALBUMIN SERPL ELPH-MCNC: 4.2 G/DL — SIGNIFICANT CHANGE UP (ref 3.3–5)
ALP SERPL-CCNC: 61 U/L — SIGNIFICANT CHANGE UP (ref 30–120)
ALT FLD-CCNC: 24 U/L — SIGNIFICANT CHANGE UP (ref 10–60)
ANION GAP SERPL CALC-SCNC: 7 MMOL/L — SIGNIFICANT CHANGE UP (ref 5–17)
AST SERPL-CCNC: 13 U/L — SIGNIFICANT CHANGE UP (ref 10–40)
BASOPHILS # BLD AUTO: 0.03 K/UL — SIGNIFICANT CHANGE UP (ref 0–0.2)
BASOPHILS NFR BLD AUTO: 0.5 % — SIGNIFICANT CHANGE UP (ref 0–2)
BILIRUB SERPL-MCNC: 0.5 MG/DL — SIGNIFICANT CHANGE UP (ref 0.2–1.2)
BUN SERPL-MCNC: 22 MG/DL — SIGNIFICANT CHANGE UP (ref 7–23)
CALCIUM SERPL-MCNC: 9.6 MG/DL — SIGNIFICANT CHANGE UP (ref 8.4–10.5)
CHLORIDE SERPL-SCNC: 108 MMOL/L — SIGNIFICANT CHANGE UP (ref 96–108)
CO2 SERPL-SCNC: 25 MMOL/L — SIGNIFICANT CHANGE UP (ref 22–31)
CREAT SERPL-MCNC: 1.5 MG/DL — HIGH (ref 0.5–1.3)
EGFR: 59 ML/MIN/1.73M2 — LOW
EOSINOPHIL # BLD AUTO: 0.13 K/UL — SIGNIFICANT CHANGE UP (ref 0–0.5)
EOSINOPHIL NFR BLD AUTO: 2.1 % — SIGNIFICANT CHANGE UP (ref 0–6)
FLUAV AG NPH QL: SIGNIFICANT CHANGE UP
FLUBV AG NPH QL: SIGNIFICANT CHANGE UP
GLUCOSE SERPL-MCNC: 99 MG/DL — SIGNIFICANT CHANGE UP (ref 70–99)
HCT VFR BLD CALC: 42.6 % — SIGNIFICANT CHANGE UP (ref 39–50)
HGB BLD-MCNC: 14.2 G/DL — SIGNIFICANT CHANGE UP (ref 13–17)
IMM GRANULOCYTES NFR BLD AUTO: 0.5 % — SIGNIFICANT CHANGE UP (ref 0–0.9)
LIDOCAIN IGE QN: 24 U/L — SIGNIFICANT CHANGE UP (ref 16–77)
LYMPHOCYTES # BLD AUTO: 1.67 K/UL — SIGNIFICANT CHANGE UP (ref 1–3.3)
LYMPHOCYTES # BLD AUTO: 26.8 % — SIGNIFICANT CHANGE UP (ref 13–44)
MAGNESIUM SERPL-MCNC: 1.9 MG/DL — SIGNIFICANT CHANGE UP (ref 1.6–2.6)
MCHC RBC-ENTMCNC: 29.8 PG — SIGNIFICANT CHANGE UP (ref 27–34)
MCHC RBC-ENTMCNC: 33.3 G/DL — SIGNIFICANT CHANGE UP (ref 32–36)
MCV RBC AUTO: 89.5 FL — SIGNIFICANT CHANGE UP (ref 80–100)
MONOCYTES # BLD AUTO: 0.76 K/UL — SIGNIFICANT CHANGE UP (ref 0–0.9)
MONOCYTES NFR BLD AUTO: 12.2 % — SIGNIFICANT CHANGE UP (ref 2–14)
NEUTROPHILS # BLD AUTO: 3.6 K/UL — SIGNIFICANT CHANGE UP (ref 1.8–7.4)
NEUTROPHILS NFR BLD AUTO: 57.9 % — SIGNIFICANT CHANGE UP (ref 43–77)
NRBC # BLD: 0 /100 WBCS — SIGNIFICANT CHANGE UP (ref 0–0)
PHOSPHATE SERPL-MCNC: 2.3 MG/DL — LOW (ref 2.5–4.5)
PLATELET # BLD AUTO: 339 K/UL — SIGNIFICANT CHANGE UP (ref 150–400)
POTASSIUM SERPL-MCNC: 4.4 MMOL/L — SIGNIFICANT CHANGE UP (ref 3.5–5.3)
POTASSIUM SERPL-SCNC: 4.4 MMOL/L — SIGNIFICANT CHANGE UP (ref 3.5–5.3)
PROT SERPL-MCNC: 7.9 G/DL — SIGNIFICANT CHANGE UP (ref 6–8.3)
RBC # BLD: 4.76 M/UL — SIGNIFICANT CHANGE UP (ref 4.2–5.8)
RBC # FLD: 12.8 % — SIGNIFICANT CHANGE UP (ref 10.3–14.5)
RSV RNA NPH QL NAA+NON-PROBE: SIGNIFICANT CHANGE UP
SARS-COV-2 RNA SPEC QL NAA+PROBE: SIGNIFICANT CHANGE UP
SODIUM SERPL-SCNC: 140 MMOL/L — SIGNIFICANT CHANGE UP (ref 135–145)
WBC # BLD: 6.22 K/UL — SIGNIFICANT CHANGE UP (ref 3.8–10.5)
WBC # FLD AUTO: 6.22 K/UL — SIGNIFICANT CHANGE UP (ref 3.8–10.5)

## 2025-01-07 PROCEDURE — 83735 ASSAY OF MAGNESIUM: CPT

## 2025-01-07 PROCEDURE — 85025 COMPLETE CBC W/AUTO DIFF WBC: CPT

## 2025-01-07 PROCEDURE — 36415 COLL VENOUS BLD VENIPUNCTURE: CPT

## 2025-01-07 PROCEDURE — 83690 ASSAY OF LIPASE: CPT

## 2025-01-07 PROCEDURE — 96360 HYDRATION IV INFUSION INIT: CPT

## 2025-01-07 PROCEDURE — 84100 ASSAY OF PHOSPHORUS: CPT

## 2025-01-07 PROCEDURE — 80053 COMPREHEN METABOLIC PANEL: CPT

## 2025-01-07 PROCEDURE — 87637 SARSCOV2&INF A&B&RSV AMP PRB: CPT

## 2025-01-07 PROCEDURE — 99284 EMERGENCY DEPT VISIT MOD MDM: CPT

## 2025-01-07 PROCEDURE — 99284 EMERGENCY DEPT VISIT MOD MDM: CPT | Mod: 25

## 2025-01-07 RX ORDER — TELMISARTAN 40 MG/1
1 TABLET ORAL
Refills: 0 | DISCHARGE

## 2025-01-07 RX ORDER — SODIUM CHLORIDE 9 MG/ML
1000 INJECTION, SOLUTION INTRAMUSCULAR; INTRAVENOUS; SUBCUTANEOUS ONCE
Refills: 0 | Status: COMPLETED | OUTPATIENT
Start: 2025-01-07 | End: 2025-01-07

## 2025-01-07 RX ORDER — SODIUM CHLORIDE 9 MG/ML
1000 INJECTION, SOLUTION INTRAVENOUS ONCE
Refills: 0 | Status: COMPLETED | OUTPATIENT
Start: 2025-01-07 | End: 2025-01-07

## 2025-01-07 RX ORDER — OMEPRAZOLE MAGNESIUM 20 MG/1
1 CAPSULE, DELAYED RELEASE ORAL
Refills: 0 | DISCHARGE

## 2025-01-07 RX ORDER — LABETALOL HCL 300 MG/1
1 TABLET, FILM COATED ORAL
Refills: 0 | DISCHARGE

## 2025-01-07 RX ORDER — SOD PHOS DI, MONO/K PHOS MONO 250 MG
1 TABLET ORAL ONCE
Refills: 0 | Status: COMPLETED | OUTPATIENT
Start: 2025-01-07 | End: 2025-01-07

## 2025-01-07 RX ADMIN — SODIUM CHLORIDE 1000 MILLILITER(S): 9 INJECTION, SOLUTION INTRAMUSCULAR; INTRAVENOUS; SUBCUTANEOUS at 12:17

## 2025-01-07 RX ADMIN — Medication 1 PACKET(S): at 12:28

## 2025-01-07 RX ADMIN — SODIUM CHLORIDE 1000 MILLILITER(S): 9 INJECTION, SOLUTION INTRAVENOUS at 11:10

## 2025-01-07 RX ADMIN — SODIUM CHLORIDE 1000 MILLILITER(S): 9 INJECTION, SOLUTION INTRAVENOUS at 12:17

## 2025-01-07 RX ADMIN — SODIUM CHLORIDE 2000 MILLILITER(S): 9 INJECTION, SOLUTION INTRAMUSCULAR; INTRAVENOUS; SUBCUTANEOUS at 11:10

## 2025-01-07 NOTE — ED PROVIDER NOTE - OBJECTIVE STATEMENT
Patient is a 44-year-old male with no significant past medical history presents with diarrhea.  Patient reports 6 days of watery diarrhea.  Patient went to primary care doctor Dr. Danuta Gorman who referred him to ED for hydration.  Patient denies fever chest pain short of breath abdominal pain nausea vomiting dysuria blood in stool recent travel

## 2025-01-07 NOTE — ED ADULT TRIAGE NOTE - CHIEF COMPLAINT QUOTE
diarrhea for 6 days with intermittent nausea. denies abd pain. denies fever. called pmd and told to come to ED for hydration. keeping fluids down

## 2025-01-07 NOTE — ED PROVIDER NOTE - CLINICAL SUMMARY MEDICAL DECISION MAKING FREE TEXT BOX
Alvin ATTG  44M w/o sig pmh cc diarrhea watery ongoing for the past couple of days, no recent travel, no pets, tried anti diarrhea w/ minimal relief     otehrwise agree w/ above     probable viral etiology will screen for e- disturbances likely dc home

## 2025-01-07 NOTE — ED ADULT NURSE NOTE - OBJECTIVE STATEMENT
45 y/o male received aox4 ambulatory c/o diarrhea x6 days, watery but denies pain other than occasional cramping. denies n/v. IVL inserted, bloods drawn and sent to lab.

## 2025-01-07 NOTE — ED PROVIDER NOTE - PATIENT PORTAL LINK FT
You can access the FollowMyHealth Patient Portal offered by [facility] by registering at the following website: [email or web address]. By joining [employer]’s FollowMyHealth portal, you will also be able to view your health information using other applications (apps) compatible with our system.

## 2025-01-07 NOTE — ED PROVIDER NOTE - CARE PROVIDER_API CALL
Yaw Gorman  [address]  [place] [address]  Phone: [phone]  Fax: [phone]  Follow Up Time: Urgent

## 2025-01-07 NOTE — ED ADULT NURSE NOTE - NSSUHOSCREENINGYN_ED_ALL_ED
Airway  Urgency: elective    Date/Time: 11/6/2017 12:45 PM  Airway not difficult    General Information and Staff    Patient location during procedure: OR  Anesthesiologist: SIRIA PARKS  CRNA: RUBI VALDEZ    Indications and Patient Condition  Indications for airway management: airway protection    Preoxygenated: yes  Mask difficulty assessment: 1 - vent by mask    Final Airway Details  Final airway type: supraglottic airway      Successful airway: unique  Size 4    Number of attempts at approach: 1               Yes - the patient is able to be screened

## 2025-01-07 NOTE — ED ADULT TRIAGE NOTE - GLASGOW COMA SCALE: BEST MOTOR RESPONSE, MLM
CONSTITUTIONAL: +weakness, fevers and chills  EYES/ENT: No visual changes;  No dysphagia  NECK: No pain or stiffness  RESPIRATORY: +nonproductive cough, shortness of breath  CARDIOVASCULAR: No chest pain or palpitations; No lower extremity edema  GASTROINTESTINAL: No abdominal or epigastric pain. No nausea, vomiting, or hematemesis; No diarrhea or constipation. No melena or hematochezia.  GENITOURINARY: No dysuria, frequency or hematuria  NEUROLOGICAL: No numbness or weakness  SKIN: No itching, burning, rashes, or lesions   All other review of systems is negative unless indicated above.
(M6) obeys commands

## 2025-01-07 NOTE — ED ADULT NURSE NOTE - CAS DISCH TRANSFER METHOD
I personally performed the service described in the documentation recorded by the scribe in my presence, and it accurately and completely records my words and actions.
Walking

## 2025-01-07 NOTE — ED PROVIDER NOTE - ATTENDING APP SHARED VISIT CONTRIBUTION OF CARE
Alvin ATTG See MDM I performed a history and physical exam of the patient and discussed their management with the Physician assistant reviewed the PAs note and agree with the documented findings and plan of care. My medical decision making and observations are found above.

## 2025-01-07 NOTE — ED PROVIDER NOTE - PROGRESS NOTE DETAILS
Patient unable to give stool sample.  All labs reviewed with patient including abnormal renal function.  Patient reports he does not want to wait and have second liter of fluids.  Advised on risk of dehydration.  Patient would like to follow-up with PCP.  Phosphorus repleted. All labs reviewed. all results reviewed with pt including abnormal results. pt given a copy of results. pt advised to follow up with pmd regarding abnormal results. All questions answered and concerns addressed. pt verbalized understanding and agreement with plan and dx. pt advised on next step and when/where to follow up. pt advised on all take home and otc medications. pt advised to follow up with PMD. pt advised to return to ed for worsenng symptoms including fever, cp, sob. will dc.

## 2025-01-07 NOTE — ED PROVIDER NOTE - NS ED MD DISPO DISCHARGE
- Home

## 2025-01-30 ENCOUNTER — RX RENEWAL (OUTPATIENT)
Age: 45
End: 2025-01-30

## 2025-01-31 ENCOUNTER — NON-APPOINTMENT (OUTPATIENT)
Age: 45
End: 2025-01-31

## 2025-01-31 ENCOUNTER — APPOINTMENT (OUTPATIENT)
Dept: CARDIOLOGY | Facility: CLINIC | Age: 45
End: 2025-01-31
Payer: MEDICAID

## 2025-01-31 VITALS
WEIGHT: 244 LBS | BODY MASS INDEX: 33.05 KG/M2 | RESPIRATION RATE: 20 BRPM | OXYGEN SATURATION: 97 % | SYSTOLIC BLOOD PRESSURE: 134 MMHG | HEIGHT: 72 IN | DIASTOLIC BLOOD PRESSURE: 82 MMHG | HEART RATE: 73 BPM

## 2025-01-31 DIAGNOSIS — I10 ESSENTIAL (PRIMARY) HYPERTENSION: ICD-10-CM

## 2025-01-31 DIAGNOSIS — I49.3 VENTRICULAR PREMATURE DEPOLARIZATION: ICD-10-CM

## 2025-01-31 PROCEDURE — 93000 ELECTROCARDIOGRAM COMPLETE: CPT

## 2025-01-31 PROCEDURE — 99214 OFFICE O/P EST MOD 30 MIN: CPT | Mod: 25

## 2025-08-09 ENCOUNTER — NON-APPOINTMENT (OUTPATIENT)
Age: 45
End: 2025-08-09